# Patient Record
Sex: FEMALE | Race: WHITE | NOT HISPANIC OR LATINO | URBAN - METROPOLITAN AREA
[De-identification: names, ages, dates, MRNs, and addresses within clinical notes are randomized per-mention and may not be internally consistent; named-entity substitution may affect disease eponyms.]

---

## 2023-12-11 NOTE — PROGRESS NOTES
"Subjective   April M Suman is a 53 y.o. female who presents as a NPV for a PHYSICAL. Annual Exam.    HPI:      52 yo post menopausal female Ex-smoker (1 ppd x 20 years + 1/2 ppd x 5 years= 22.5 packs years; quit 7 years ago) presenting as a NPV for a PHYSICAL.      EMR reviewed. No records available for review.    PMHx:  -newly diagnosed DM2; POCT HgBA1c 7.2 today; 12/12/23==> will start metformin 500 mg XR daily today  -HTN; not on medications  -hyperglycemia; on home POCT testing per patient (170s)  -asthma  -uterine fibroids (per patient)    Healthcare Providers:  -PCP: NONE; last saw a PCP 15+ years ago    Preventive Health Services:  -Last physical: 15+ years ago  -last mammogram: 11+ years  -last colonoscopy: NEVER; NOW DUE  -Hep C screening:?  -last STI screening:  15+ years ago    Immunizations:   -Childhood vaccines: completed per patient  -COVID vaccine: did not complete  -COVID booster: did not complete  -updated COVID spike vaccine: NOW DUE==> patient declined today  -updated COVID spike vaccine: NOW DUE==> patient declined today  -Flu vaccine: NOW DUE==> patient declined today  -shingles vaccine: NOW DUE==> patient declined today  -TDAP:  NOW DUE==> RECEIVED TODAY 12/12/23  -pneumonia vaccine Now due given DM2: NOW DUE==>patient declined today    There is no immunization history on file for this patient.,    Today April reports:     -overall doing and feeling well    -she has not seen a doctor in over 15 years.    -very elevated home BP readings 160-180s/90s-100s.  She reports that she is not currently on BP medications, and is interested in starting.  She denies CP, heart palpitations, headache, blurry vision, SOB, cough, orthopnea, weakness,  or LE swelling.      -she has been checking her blood glucose at home that ranges in 170s.  She reports that she is \"sure she has diabetes,\" but is not yet on medications and is trying to follow a low carbohydrate diet. She expressed interest in starting " "diabetes medications.    -ongoing tinea pedis of heels, and is using OTC cream.  She expressed interest in referral to podiatry for further evaluation and also interested in prescription for Lamisil cream. She denies cracked, red, bleeding or painful skin on feet.     Today she has no other reported complaints, issues, or problems.      Not currently sexually active. Denies history STIs    ROS is NEG for HEADACHE, NAUSEA, VOMITING, DIARRHEA, CHEST PAIN, SOB, and BLEEDING.Review of systems is negative for all systems except for any identified issues in HPI above.      SHx:  -lives with:  and 19 yo daughter. 3 kids (ages 36, 33, 19 yo). 20 lives at home, applying for medical school   -employment:  [employer].   -tobacco use: ex-smoker; 22.5 pack-years   -alcohol use: drinks 2-3 drinks every 3-4 days during the holiday season.  During the non-holiday season she reporting drinking 2 drinks once a month.  -illicits: DENIES     FHx:  -Cancer: DENIES  -HTN: MOTHER  -DM: MOTHER, maternal uncle, maternal aunt,  -Heart Disease: DENIES  -Stroke: paternal grandmother  -Thyroid Disease: DENIES       Objective     BP (!) 158/104   Pulse 88   Temp 36.9 °C (98.5 °F)   Resp 18   Ht 1.753 m (5' 9\")   Wt 118 kg (260 lb 6.4 oz)   SpO2 98%   BMI 38.45 kg/m²        BP manual repeat: 150/94; asymptomatic.    EK bpm, NSR, no acute ischemic changes. EKG abnormal with possible anterior infarct, age undetermined.    COMPLETE PHYSICAL EXAM    GENERAL           General Appearance: pleasant, well-appearing, well-developed, well-hydrated, well-nourished, well-groomed, .        HEENT           NECK supple, DIFFUSELY ENLARGED THYROID, THYROID NON-TENDER TO PALPATION, NO PALPABLE MASSES or NODULES, Neg for adenopathy, Oropharynx normal no exudates. Ears: TM intact and normal bilaterally, no effusions, no signs of infection, no cerumen or debris in ear canals bilaterally.       EYES           Pupils: PERRLA, no photophobia. "        HEART           Rate and Rhythm regular rate and rhythm. Heart sounds: normal S1S2. Murmurs: none.        LUNGS           Effort: Normal chest wall, no pectus, Normal air entry all fields, Clear to IPPA, RR<16 with no use of accessory muscles.        BREASTS           Bilaterally: no masses, no nipple retraction, no nipple discharge, no abnormal skin changes. Axilla: no lymphadenopathy.        BACK           General: unremarkable, no spinal tenderness or rashes.        ABDOMEN           General: NDNT to palpation. Non-tender Apple-sized mass palpated in right lower abdomen quadrant/suprapubic region. No other masses palpated. No HSM. BSs heard in all quadrants           RECTAL: deferred per patient preference       LYMPHATICS           Cervical: none. Axillary: none.        MUSCULOSKELETAL           gross abnormalities no gross abnormalities, no joint redness or swelling.        EXTREMITIES           Varicose veins: not present. Pulses: 2+ bilateral. Clubbing: none. Cyanosis: no.        NEUROLOGICAL           Orientation: alert and oriented x 3. Grossly normal: yes. Plantars: downgoing bilaterally. Muscle Bulk: normal . Cranial Nerves: CN's II-XII grossly intact.        PSYCHOLOGY           Affect: appropriate. Mood: pleasant.       SKIN: atypical nevi on nose, no ulceration or bleeding. FEET: bilateral thickened, whitish cracked heels, most consistent with tinea pedis infection. No bleeding. No signs of cellulitis. No evidence tinea pedis infection between toes bilaterally. No other rashes or lesions on other body surfaces.    Assessment/Plan   Problem List Items Addressed This Visit    None  Visit Diagnoses       Physical exam    -  Primary    Relevant Orders    CBC and Auto Differential    Comprehensive metabolic panel    Urinalysis with Reflex Microscopic    Tsh With Reflex To Free T4 If Abnormal    Encounter for screening mammogram for malignant neoplasm of breast        Relevant Orders    BI mammo  bilateral screening tomosynthesis    Encounter for screening for coronary artery disease        Relevant Orders    CT cardiac scoring wo IV contrast    Encounter to establish care        Relevant Orders    CBC and Auto Differential    Comprehensive metabolic panel    Urinalysis with Reflex Microscopic    Tsh With Reflex To Free T4 If Abnormal    Vitamin D deficiency        Relevant Orders    Vitamin D 25-Hydroxy,Total (for eval of Vitamin D levels)    Lipid screening        Diabetes mellitus screening        Routine screening for STI (sexually transmitted infection)        Relevant Orders    HIV 1/2 Antigen/Antibody Screen with Reflex to Confirmation    Syphilis Screen with Reflex    C. trachomatis / N. gonorrhoeae, DNA probe    Trichomonas vaginalis, Amplified    Encounter for hepatitis C screening test for low risk patient        Relevant Orders    Hepatitis C antibody    Colon cancer screening        Relevant Orders    Colonoscopy Screening; Average Risk Patient    Immunization counseling        Uncontrolled hypertension        Relevant Orders    ECG 12 lead (Clinic Performed)        Physical and Establish PCP  -labs ordered (see A/P above for details)    Lipid screening  -lipid panel ordered    Uncontrolled HTN: asymptomatic.   -EK bpm, NSR, no acute ischemic changes. EKG abnormal with possible anterior infarct, age undetermined.  -start losartan 50 mg daily; will adjust BP medication/dosing as needed at follow-up visit  -diabetic low salt, low cholesterol, low fat diet, regularly exercise, limit alcohol intake  -Emergency Dept and EMS/911 precautions discussed and reviewed with patiebt    DM2: POCT HgBA1c 7.2 today  -hemoglobin A1c 7.2 today  -CMP, HgBA1c POCT, CBC, urine albumin ordered  -start metformin 500 mg XR daily  -diabetic low salt, low cholesterol, low fat diet, regularly exercise, limit alcohol intake  -referral to podiatry for diabetic foot care and ophthalmology for diabetic exam exam  -at  follow-up visit will refer to diabetes education, per patient preference  -diabetic foot exam in 4 weeks at follow-up appointment  -Emergency Dept hypo- and hyper-glycemic precautions discussed and reviewed    Right lower quadrant/suprapubic mass, non-tender to palpation: r/o neoplasm vs uterine fibroids  -CT A/P w and w/o contrast ordered  -based on CT A/P results will refer to appropriately specialty for further evaluation (general surgery vs GI vs GYN, etc)    Tinea pedis:  -start terbinafine cream bid x 14 days  -referral to podiatry and dermatology ordered  -at follow-up after labs have resulted, will consider oral terbinfine therapy if appropriate and if patient agrees to avoid alcohol consumption while taking    Enlarged thyroid:  -TSH/T4 ordered  -thyroid US ordered    Atypical Nevi on nose: no ulceration or bleeding  -referral to dermatology ordered    Hep C Screening  -Hep C ab ordered    Breast Cancer Screening  -mammogram ordered    Colon Cancer Screening  -referral for colonoscopy ordered    Vit D deficiency  -Vit D levels ordered    STI Screening  -HIV, syphilis,  GC/CT/trich ordered    Heart Disease Screening  -CT Heart Ca score ordered    Cervical Cancer Screening:  -at follow-up will refer to GYN for pap smear    Immunization Counseling  -FLU vaccine, TDAP, pneumonia, updated COVID vaccine and shingles: NOW DUE==> patient declined FLU vaccine, shingles, COVID, and pneumonia today, RECEIVED TDAP today    FOLLOW-UP: 7-10 days for BP check and 4 weeks to discuss and review test results               Zari Bell MD, PhD

## 2023-12-12 ENCOUNTER — OFFICE VISIT (OUTPATIENT)
Dept: PRIMARY CARE | Facility: CLINIC | Age: 53
End: 2023-12-12
Payer: COMMERCIAL

## 2023-12-12 VITALS
HEART RATE: 88 BPM | DIASTOLIC BLOOD PRESSURE: 104 MMHG | WEIGHT: 260.4 LBS | OXYGEN SATURATION: 98 % | RESPIRATION RATE: 18 BRPM | SYSTOLIC BLOOD PRESSURE: 158 MMHG | TEMPERATURE: 98.5 F | BODY MASS INDEX: 38.57 KG/M2 | HEIGHT: 69 IN

## 2023-12-12 DIAGNOSIS — Z76.89 ENCOUNTER TO ESTABLISH CARE: ICD-10-CM

## 2023-12-12 DIAGNOSIS — R73.09 ELEVATED GLUCOSE: ICD-10-CM

## 2023-12-12 DIAGNOSIS — Z12.11 COLON CANCER SCREENING: ICD-10-CM

## 2023-12-12 DIAGNOSIS — Z13.6 ENCOUNTER FOR SCREENING FOR CORONARY ARTERY DISEASE: ICD-10-CM

## 2023-12-12 DIAGNOSIS — E55.9 VITAMIN D DEFICIENCY: ICD-10-CM

## 2023-12-12 DIAGNOSIS — Z11.59 ENCOUNTER FOR HEPATITIS C SCREENING TEST FOR LOW RISK PATIENT: ICD-10-CM

## 2023-12-12 DIAGNOSIS — E04.9 ENLARGED THYROID: ICD-10-CM

## 2023-12-12 DIAGNOSIS — I10 UNCONTROLLED HYPERTENSION: ICD-10-CM

## 2023-12-12 DIAGNOSIS — Z87.891 EX-SMOKER: ICD-10-CM

## 2023-12-12 DIAGNOSIS — Z28.21 HERPES ZOSTER VACCINATION DECLINED: ICD-10-CM

## 2023-12-12 DIAGNOSIS — Z71.85 IMMUNIZATION COUNSELING: ICD-10-CM

## 2023-12-12 DIAGNOSIS — Z28.21 INFLUENZA VACCINATION DECLINED: ICD-10-CM

## 2023-12-12 DIAGNOSIS — D22.39 ATYPICAL NEVUS OF NOSE: ICD-10-CM

## 2023-12-12 DIAGNOSIS — Z23 ENCOUNTER FOR ADMINISTRATION OF VACCINE: ICD-10-CM

## 2023-12-12 DIAGNOSIS — Z00.00 PHYSICAL EXAM: Primary | ICD-10-CM

## 2023-12-12 DIAGNOSIS — E11.9 TYPE 2 DIABETES MELLITUS WITHOUT COMPLICATION, WITHOUT LONG-TERM CURRENT USE OF INSULIN (MULTI): ICD-10-CM

## 2023-12-12 DIAGNOSIS — B35.3 TINEA PEDIS OF BOTH FEET: ICD-10-CM

## 2023-12-12 DIAGNOSIS — Z12.31 ENCOUNTER FOR SCREENING MAMMOGRAM FOR MALIGNANT NEOPLASM OF BREAST: ICD-10-CM

## 2023-12-12 DIAGNOSIS — R19.03 RIGHT LOWER QUADRANT ABDOMINAL MASS: ICD-10-CM

## 2023-12-12 DIAGNOSIS — Z11.3 ROUTINE SCREENING FOR STI (SEXUALLY TRANSMITTED INFECTION): ICD-10-CM

## 2023-12-12 DIAGNOSIS — Z13.1 DIABETES MELLITUS SCREENING: ICD-10-CM

## 2023-12-12 DIAGNOSIS — Z13.220 LIPID SCREENING: ICD-10-CM

## 2023-12-12 LAB — POC HEMOGLOBIN: 7.2 G/DL (ref 12–16)

## 2023-12-12 PROCEDURE — 85018 HEMOGLOBIN: CPT | Mod: QW | Performed by: FAMILY MEDICINE

## 2023-12-12 PROCEDURE — 99386 PREV VISIT NEW AGE 40-64: CPT | Performed by: FAMILY MEDICINE

## 2023-12-12 PROCEDURE — 93005 ELECTROCARDIOGRAM TRACING: CPT | Performed by: FAMILY MEDICINE

## 2023-12-12 PROCEDURE — 3077F SYST BP >= 140 MM HG: CPT | Performed by: FAMILY MEDICINE

## 2023-12-12 PROCEDURE — 3080F DIAST BP >= 90 MM HG: CPT | Performed by: FAMILY MEDICINE

## 2023-12-12 PROCEDURE — 1036F TOBACCO NON-USER: CPT | Performed by: FAMILY MEDICINE

## 2023-12-12 PROCEDURE — 93000 ELECTROCARDIOGRAM COMPLETE: CPT | Performed by: FAMILY MEDICINE

## 2023-12-12 PROCEDURE — 4010F ACE/ARB THERAPY RXD/TAKEN: CPT | Performed by: FAMILY MEDICINE

## 2023-12-12 PROCEDURE — 90471 IMMUNIZATION ADMIN: CPT | Performed by: FAMILY MEDICINE

## 2023-12-12 RX ORDER — PRENATAL VIT 91/IRON/FOLIC/DHA 28-975-200
COMBINATION PACKAGE (EA) ORAL 2 TIMES DAILY
Qty: 28.4 G | Refills: 1 | Status: SHIPPED | OUTPATIENT
Start: 2023-12-12 | End: 2023-12-26

## 2023-12-12 RX ORDER — LOSARTAN POTASSIUM 50 MG/1
50 TABLET ORAL DAILY
Qty: 30 TABLET | Refills: 11 | Status: SHIPPED | OUTPATIENT
Start: 2023-12-12 | End: 2024-12-11

## 2023-12-12 RX ORDER — BLOOD-GLUCOSE SENSOR
EACH MISCELLANEOUS
Qty: 4 EACH | Refills: 11 | Status: SHIPPED | OUTPATIENT
Start: 2023-12-12

## 2023-12-12 RX ORDER — METFORMIN HYDROCHLORIDE 500 MG/1
500 TABLET, EXTENDED RELEASE ORAL
Qty: 30 TABLET | Refills: 11 | Status: SHIPPED | OUTPATIENT
Start: 2023-12-12 | End: 2024-12-11

## 2023-12-12 RX ORDER — BLOOD-GLUCOSE,RECEIVER,CONT
EACH MISCELLANEOUS
Qty: 1 EACH | Refills: 0 | Status: SHIPPED | OUTPATIENT
Start: 2023-12-12

## 2023-12-12 ASSESSMENT — COLUMBIA-SUICIDE SEVERITY RATING SCALE - C-SSRS
1. IN THE PAST MONTH, HAVE YOU WISHED YOU WERE DEAD OR WISHED YOU COULD GO TO SLEEP AND NOT WAKE UP?: NO
6. HAVE YOU EVER DONE ANYTHING, STARTED TO DO ANYTHING, OR PREPARED TO DO ANYTHING TO END YOUR LIFE?: NO
2. HAVE YOU ACTUALLY HAD ANY THOUGHTS OF KILLING YOURSELF?: NO

## 2023-12-12 ASSESSMENT — ENCOUNTER SYMPTOMS
LOSS OF SENSATION IN FEET: 0
DEPRESSION: 0
OCCASIONAL FEELINGS OF UNSTEADINESS: 0

## 2023-12-12 ASSESSMENT — PATIENT HEALTH QUESTIONNAIRE - PHQ9
2. FEELING DOWN, DEPRESSED OR HOPELESS: NOT AT ALL
1. LITTLE INTEREST OR PLEASURE IN DOING THINGS: NOT AT ALL
SUM OF ALL RESPONSES TO PHQ9 QUESTIONS 1 AND 2: 0

## 2023-12-12 ASSESSMENT — PAIN SCALES - GENERAL: PAINLEVEL: 0-NO PAIN

## 2023-12-12 NOTE — PATIENT INSTRUCTIONS
It was nice meeting you today    -please have your labs drawn at [facility] or another  facility    -please start losartan 50 mg daily for uncontrolled high blood pressure.      -please start metformin 500 mg XR once daily    -Please call radiology to schedule imaging tests    -Emergency Dept and 911/EMS precautions as we reviewed and discussed    -please follow a low salt, diabetic, low cholesterol, low fat diet, regularly exercise, and limit alcohol intake    -please schedule to see me in 7-10 days for blood pressure check and in 4 weeks to discuss and review your test results    -I recommend receiving the updated COVID vaccine at your local pharmacy

## 2024-12-09 ENCOUNTER — APPOINTMENT (OUTPATIENT)
Dept: OPHTHALMOLOGY | Facility: CLINIC | Age: 54
End: 2024-12-09
Payer: COMMERCIAL

## 2024-12-09 DIAGNOSIS — H52.223 REGULAR ASTIGMATISM OF BOTH EYES WITH PRESBYOPIA: ICD-10-CM

## 2024-12-09 DIAGNOSIS — E11.9 TYPE 2 DIABETES MELLITUS WITHOUT RETINOPATHY (MULTI): Primary | ICD-10-CM

## 2024-12-09 DIAGNOSIS — H52.4 REGULAR ASTIGMATISM OF BOTH EYES WITH PRESBYOPIA: ICD-10-CM

## 2024-12-09 PROCEDURE — 92015 DETERMINE REFRACTIVE STATE: CPT | Performed by: STUDENT IN AN ORGANIZED HEALTH CARE EDUCATION/TRAINING PROGRAM

## 2024-12-09 PROCEDURE — 92004 COMPRE OPH EXAM NEW PT 1/>: CPT | Performed by: STUDENT IN AN ORGANIZED HEALTH CARE EDUCATION/TRAINING PROGRAM

## 2024-12-09 ASSESSMENT — ENCOUNTER SYMPTOMS
RESPIRATORY NEGATIVE: 0
HEMATOLOGIC/LYMPHATIC NEGATIVE: 0
ENDOCRINE NEGATIVE: 0
ALLERGIC/IMMUNOLOGIC NEGATIVE: 0
PSYCHIATRIC NEGATIVE: 0
EYES NEGATIVE: 1
CONSTITUTIONAL NEGATIVE: 0
NEUROLOGICAL NEGATIVE: 0
CARDIOVASCULAR NEGATIVE: 0
MUSCULOSKELETAL NEGATIVE: 0
GASTROINTESTINAL NEGATIVE: 0

## 2024-12-09 ASSESSMENT — SLIT LAMP EXAM - LIDS
COMMENTS: NORMAL
COMMENTS: NORMAL

## 2024-12-09 ASSESSMENT — EXTERNAL EXAM - RIGHT EYE: OD_EXAM: NORMAL

## 2024-12-09 ASSESSMENT — REFRACTION_MANIFEST
OS_SPHERE: +0.50
OD_ADD: +1.50
OD_CYLINDER: -1.25
OS_CYLINDER: -1.25
OD_SPHERE: +0.25
OS_AXIS: 092
OD_AXIS: 081
OS_ADD: +1.50

## 2024-12-09 ASSESSMENT — CONF VISUAL FIELD
OD_SUPERIOR_TEMPORAL_RESTRICTION: 0
OS_SUPERIOR_NASAL_RESTRICTION: 0
METHOD: COUNTING FINGERS
OD_NORMAL: 1
OS_SUPERIOR_TEMPORAL_RESTRICTION: 0
OD_SUPERIOR_NASAL_RESTRICTION: 0
OS_INFERIOR_NASAL_RESTRICTION: 0
OS_NORMAL: 1
OD_INFERIOR_NASAL_RESTRICTION: 0
OS_INFERIOR_TEMPORAL_RESTRICTION: 0
OD_INFERIOR_TEMPORAL_RESTRICTION: 0

## 2024-12-09 ASSESSMENT — CUP TO DISC RATIO
OD_RATIO: .30
OS_RATIO: .30

## 2024-12-09 ASSESSMENT — VISUAL ACUITY
METHOD: SNELLEN - LINEAR
OD_SC+: -2
OS_SC: 20/20
OD_SC: 20/30

## 2024-12-09 ASSESSMENT — EXTERNAL EXAM - LEFT EYE: OS_EXAM: NORMAL

## 2024-12-09 ASSESSMENT — TONOMETRY
OD_IOP_MMHG: 21
IOP_METHOD: TONOPEN
OS_IOP_MMHG: 19

## 2024-12-09 NOTE — PROGRESS NOTES
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 Ocular health wnl for age OU. Monitor 1 year or sooner prn. Refraction billed today.    RTC 1 year for annual with MIAN and GEORGI